# Patient Record
Sex: MALE | Race: WHITE
[De-identification: names, ages, dates, MRNs, and addresses within clinical notes are randomized per-mention and may not be internally consistent; named-entity substitution may affect disease eponyms.]

---

## 2019-08-09 ENCOUNTER — HOSPITAL ENCOUNTER (OUTPATIENT)
Dept: HOSPITAL 92 - SDC | Age: 39
Discharge: HOME | End: 2019-08-09
Attending: SURGERY
Payer: COMMERCIAL

## 2019-08-09 VITALS — BODY MASS INDEX: 36.9 KG/M2

## 2019-08-09 DIAGNOSIS — E66.9: ICD-10-CM

## 2019-08-09 DIAGNOSIS — L05.91: Primary | ICD-10-CM

## 2019-08-09 DIAGNOSIS — Z87.891: ICD-10-CM

## 2019-08-09 DIAGNOSIS — F10.11: ICD-10-CM

## 2019-08-09 DIAGNOSIS — F32.9: ICD-10-CM

## 2019-08-09 DIAGNOSIS — L57.8: ICD-10-CM

## 2019-08-09 LAB
ANION GAP SERPL CALC-SCNC: 11 MMOL/L (ref 10–20)
BASOPHILS # BLD AUTO: 0.1 THOU/UL (ref 0–0.2)
BASOPHILS NFR BLD AUTO: 0.9 % (ref 0–1)
BUN SERPL-MCNC: 16 MG/DL (ref 8.9–20.6)
CALCIUM SERPL-MCNC: 9.1 MG/DL (ref 7.8–10.44)
CHLORIDE SERPL-SCNC: 107 MMOL/L (ref 98–107)
CO2 SERPL-SCNC: 25 MMOL/L (ref 22–29)
CREAT CL PREDICTED SERPL C-G-VRATE: 224 ML/MIN (ref 70–130)
EOSINOPHIL # BLD AUTO: 0.3 THOU/UL (ref 0–0.7)
EOSINOPHIL NFR BLD AUTO: 4.5 % (ref 0–10)
GLUCOSE SERPL-MCNC: 93 MG/DL (ref 70–105)
HGB BLD-MCNC: 15 G/DL (ref 14–18)
LYMPHOCYTES # BLD: 2.5 THOU/UL (ref 1.2–3.4)
LYMPHOCYTES NFR BLD AUTO: 37.6 % (ref 21–51)
MCH RBC QN AUTO: 30.8 PG (ref 27–31)
MCV RBC AUTO: 91 FL (ref 78–98)
MONOCYTES # BLD AUTO: 0.6 THOU/UL (ref 0.11–0.59)
MONOCYTES NFR BLD AUTO: 9.1 % (ref 0–10)
NEUTROPHILS # BLD AUTO: 3.2 THOU/UL (ref 1.4–6.5)
NEUTROPHILS NFR BLD AUTO: 47.9 % (ref 42–75)
PLATELET # BLD AUTO: 205 THOU/UL (ref 130–400)
POTASSIUM SERPL-SCNC: 4.1 MMOL/L (ref 3.5–5.1)
RBC # BLD AUTO: 4.87 MILL/UL (ref 4.7–6.1)
SODIUM SERPL-SCNC: 139 MMOL/L (ref 136–145)
WBC # BLD AUTO: 6.6 THOU/UL (ref 4.8–10.8)

## 2019-08-09 PROCEDURE — 88304 TISSUE EXAM BY PATHOLOGIST: CPT

## 2019-08-09 PROCEDURE — 80048 BASIC METABOLIC PNL TOTAL CA: CPT

## 2019-08-09 PROCEDURE — 0JB90ZZ EXCISION OF BUTTOCK SUBCUTANEOUS TISSUE AND FASCIA, OPEN APPROACH: ICD-10-PCS | Performed by: SURGERY

## 2019-08-09 PROCEDURE — 85025 COMPLETE CBC W/AUTO DIFF WBC: CPT

## 2019-08-09 PROCEDURE — 36415 COLL VENOUS BLD VENIPUNCTURE: CPT

## 2019-08-09 PROCEDURE — 88305 TISSUE EXAM BY PATHOLOGIST: CPT

## 2019-08-15 NOTE — PDOC.OP
Operative Note





- Operative Note


Operative Note: 





PROCEDURE: Excision of right shoulder skin cyst and pilonidal cyst





SURGEON: Bravo Kraus M.D.





DATE: 8/9/2019





PREOPERATIVE DIAGNOSIS: Pilonidal cyst and right shoulder skin cyst





POSTOPERATIVE DIAGNOSIS: Pilonidal cyst and right shoulder skin cyst





HISTORY: Patient with history of intermittent swelling and drainage near the 

upper gluteal cleft with a palpable cystic mass but no external opening, felt 

to most consistent with a pilonidal cyst. He also has a slowly enlarging 

subcutaneous mass in the right shoulder felt to be consistent with a skin cyst. 

He presents for excision of both for diagnostic and symptomatic purposes.





PROCEDURE IN DETAIL: After informed consent was obtained and appropriate 

preoperative antibiotics administered the patient was taken to the operating 

room where he was placed in supine position and general anesthesia was 

administered he was then moved to the prone position with appropriate padding 

and support of the extremities and prepped and draped in standard sterile 

fashion. Local anesthesia was infused the skin and subcutaneous tissues 

surrounding the right shoulder cyst. No elliptical incision was made 

incorporating the external punctum and the cyst was excised in its entirety and 

sent to pathology. The wound was irrigated and hemostasis obtained using Bovie 

electrocautery. The subcutaneous tissues were reapproximated with 3-0 Monocryl 

suture and the skin was closed with 4-0 Monocryl suture. Attention was then 

turned to the pilonidal cysts. Local anesthesia was infused circumferentially 

for a field block and a vertical elliptical incision made overlying the 

palpable cystic mass. This was dissected free from the subcutaneous tissues 

circumferentially and had an appearance most consistent with a chronic 

pilonidal cyst. This was sent to pathology in its entirety and the wound 

irrigated and examined. No residual cyst wall was identified. Additional local 

anesthesia was infused for postoperative pain control and the skin and 

subcutaneous tissues were closed in multiple layers using Vicryl suture in the 

subcutaneous tissues and a running 4-0 subcuticular Monocryl suture in the 

skin. Dermabond dressings were placed and the patient was extubated and taken 

to recovery in good condition. Estimated blood loss is minimal. There were no 

complications. Specimen is right shoulder skin cyst and pilonidal cyst.

## 2019-08-20 NOTE — PQF
[facility]

POST DISCHARGE CLINICAL DOCUMENTATION IMPROVEMENT CLARIFICATION FORM 



 l



 Todays Date: 08/19/19

  l

Patients Name LIZ IRWIN

MRN [id number]

  l

Acct # [id number]

  l

Admit Date 08/09/19

  l

Disch Date 08/09/19









  Name Ld Leahy Jr.

Email: [email or web address]

Cell: [phone] 





To be completed by : 







Present Clinical Indicators - Signs / Symptoms Results and Location in Medical 
Record 

 

[ ] Documentation of: 

 

[ ]  

 

[ ] Documentation of: 

 

[ ]  

 

[ ] Documentation of: 

 

[ ]  

 

[ ] Documentation of: 

 

[ ]  

 

[ ]  

 

Risks  

 

[ ]  

 

[ ]  

 

[ ]  

 

Treatment  

 

[ ] Excision of right shoulder cyst and pilonidal cyst Query for size and 
margin of shoulder cyst and pilonidal cyst

 

[ ]  

 

[ ]  















To be completed by Physician:  

FOX RAMIREZ



The documentation in this patients record requires clarification to ensure 
coding compliance and accuracy. 



Check the appropriate box and include in your discharge summary. 

[ ] _____________________________________________

[ ] Please check this box if this does not apply to this patient

[ ] Unable to determine

[ ] Other diagnosis: ________________________________



Review the following information and exercise your independent professional 
judgment in responding to the clarification. Based upon the clinical findings, 
risk factors, and treatment, please clarify if you are treating one of the 
above probable or suspected diagnoses. 





Physician Signature: _____________________________ Date______________ Time______
__________
MTDD